# Patient Record
Sex: FEMALE | NOT HISPANIC OR LATINO | Employment: FULL TIME | URBAN - METROPOLITAN AREA
[De-identification: names, ages, dates, MRNs, and addresses within clinical notes are randomized per-mention and may not be internally consistent; named-entity substitution may affect disease eponyms.]

---

## 2024-01-03 ENCOUNTER — HOSPITAL ENCOUNTER (EMERGENCY)
Facility: HOSPITAL | Age: 34
Discharge: LEFT BEFORE DISCHARGE | End: 2024-01-03
Attending: EMERGENCY MEDICINE | Admitting: EMERGENCY MEDICINE
Payer: COMMERCIAL

## 2024-01-03 VITALS
HEIGHT: 66 IN | OXYGEN SATURATION: 100 % | TEMPERATURE: 98.8 F | BODY MASS INDEX: 25.3 KG/M2 | DIASTOLIC BLOOD PRESSURE: 79 MMHG | SYSTOLIC BLOOD PRESSURE: 134 MMHG | HEART RATE: 55 BPM | RESPIRATION RATE: 18 BRPM | WEIGHT: 157.41 LBS

## 2024-01-03 DIAGNOSIS — Z53.21 ELOPED FROM EMERGENCY DEPARTMENT: Primary | ICD-10-CM

## 2024-01-03 LAB
ALBUMIN SERPL-MCNC: 4.2 G/DL (ref 3.5–5.2)
ALBUMIN/GLOB SERPL: 1.2 G/DL
ALP SERPL-CCNC: 61 U/L (ref 39–117)
ALT SERPL W P-5'-P-CCNC: 12 U/L (ref 1–33)
ANION GAP SERPL CALCULATED.3IONS-SCNC: 10 MMOL/L (ref 5–15)
AST SERPL-CCNC: 14 U/L (ref 1–32)
BACTERIA UR QL AUTO: ABNORMAL /HPF
BASOPHILS # BLD AUTO: 0.03 10*3/MM3 (ref 0–0.2)
BASOPHILS NFR BLD AUTO: 0.4 % (ref 0–1.5)
BILIRUB SERPL-MCNC: 0.3 MG/DL (ref 0–1.2)
BILIRUB UR QL STRIP: NEGATIVE
BUN SERPL-MCNC: 5 MG/DL (ref 6–20)
BUN/CREAT SERPL: 6.4 (ref 7–25)
CALCIUM SPEC-SCNC: 9.3 MG/DL (ref 8.6–10.5)
CHLORIDE SERPL-SCNC: 106 MMOL/L (ref 98–107)
CLARITY UR: ABNORMAL
CO2 SERPL-SCNC: 21 MMOL/L (ref 22–29)
COLOR UR: YELLOW
CREAT SERPL-MCNC: 0.78 MG/DL (ref 0.57–1)
DEPRECATED RDW RBC AUTO: 40.8 FL (ref 37–54)
EGFRCR SERPLBLD CKD-EPI 2021: 103.6 ML/MIN/1.73
EOSINOPHIL # BLD AUTO: 0.06 10*3/MM3 (ref 0–0.4)
EOSINOPHIL NFR BLD AUTO: 0.8 % (ref 0.3–6.2)
ERYTHROCYTE [DISTWIDTH] IN BLOOD BY AUTOMATED COUNT: 12.9 % (ref 12.3–15.4)
GLOBULIN UR ELPH-MCNC: 3.6 GM/DL
GLUCOSE SERPL-MCNC: 110 MG/DL (ref 65–99)
GLUCOSE UR STRIP-MCNC: NEGATIVE MG/DL
HCG INTACT+B SERPL-ACNC: <0.5 MIU/ML
HCT VFR BLD AUTO: 40.5 % (ref 34–46.6)
HGB BLD-MCNC: 13.8 G/DL (ref 12–15.9)
HGB UR QL STRIP.AUTO: NEGATIVE
HOLD SPECIMEN: NORMAL
HOLD SPECIMEN: NORMAL
HYALINE CASTS UR QL AUTO: ABNORMAL /LPF
IMM GRANULOCYTES # BLD AUTO: 0.02 10*3/MM3 (ref 0–0.05)
IMM GRANULOCYTES NFR BLD AUTO: 0.3 % (ref 0–0.5)
KETONES UR QL STRIP: ABNORMAL
LEUKOCYTE ESTERASE UR QL STRIP.AUTO: NEGATIVE
LIPASE SERPL-CCNC: 28 U/L (ref 13–60)
LYMPHOCYTES # BLD AUTO: 0.63 10*3/MM3 (ref 0.7–3.1)
LYMPHOCYTES NFR BLD AUTO: 8.1 % (ref 19.6–45.3)
MCH RBC QN AUTO: 29.7 PG (ref 26.6–33)
MCHC RBC AUTO-ENTMCNC: 34.1 G/DL (ref 31.5–35.7)
MCV RBC AUTO: 87.3 FL (ref 79–97)
MONOCYTES # BLD AUTO: 0.4 10*3/MM3 (ref 0.1–0.9)
MONOCYTES NFR BLD AUTO: 5.2 % (ref 5–12)
NEUTROPHILS NFR BLD AUTO: 6.61 10*3/MM3 (ref 1.7–7)
NEUTROPHILS NFR BLD AUTO: 85.2 % (ref 42.7–76)
NITRITE UR QL STRIP: NEGATIVE
NRBC BLD AUTO-RTO: 0 /100 WBC (ref 0–0.2)
PH UR STRIP.AUTO: >=9 [PH] (ref 5–8)
PLATELET # BLD AUTO: 298 10*3/MM3 (ref 140–450)
PMV BLD AUTO: 10.7 FL (ref 6–12)
POTASSIUM SERPL-SCNC: 4 MMOL/L (ref 3.5–5.2)
PROT SERPL-MCNC: 7.8 G/DL (ref 6–8.5)
PROT UR QL STRIP: ABNORMAL
RBC # BLD AUTO: 4.64 10*6/MM3 (ref 3.77–5.28)
RBC # UR STRIP: ABNORMAL /HPF
REF LAB TEST METHOD: ABNORMAL
SODIUM SERPL-SCNC: 137 MMOL/L (ref 136–145)
SP GR UR STRIP: 1.02 (ref 1–1.03)
SQUAMOUS #/AREA URNS HPF: ABNORMAL /HPF
UROBILINOGEN UR QL STRIP: ABNORMAL
WBC # UR STRIP: ABNORMAL /HPF
WBC NRBC COR # BLD AUTO: 7.75 10*3/MM3 (ref 3.4–10.8)
WHOLE BLOOD HOLD COAG: NORMAL
WHOLE BLOOD HOLD SPECIMEN: NORMAL

## 2024-01-03 PROCEDURE — 99283 EMERGENCY DEPT VISIT LOW MDM: CPT

## 2024-01-03 PROCEDURE — 84702 CHORIONIC GONADOTROPIN TEST: CPT

## 2024-01-03 PROCEDURE — 36415 COLL VENOUS BLD VENIPUNCTURE: CPT

## 2024-01-03 PROCEDURE — 80053 COMPREHEN METABOLIC PANEL: CPT

## 2024-01-03 PROCEDURE — 83690 ASSAY OF LIPASE: CPT

## 2024-01-03 PROCEDURE — 85025 COMPLETE CBC W/AUTO DIFF WBC: CPT

## 2024-01-03 PROCEDURE — 81001 URINALYSIS AUTO W/SCOPE: CPT

## 2024-01-03 RX ORDER — SODIUM CHLORIDE 0.9 % (FLUSH) 0.9 %
10 SYRINGE (ML) INJECTION AS NEEDED
Status: DISCONTINUED | OUTPATIENT
Start: 2024-01-03 | End: 2024-01-03 | Stop reason: HOSPADM

## 2024-01-03 NOTE — ED PROVIDER NOTES
"Time: 2:46 PM EST  Date of encounter:  1/3/2024  Independent Historian/Clinical History and Information was obtained by:   Patient    History is limited by: N/A    Chief Complaint   Patient presents with    Abdominal Pain    Vomiting    Nausea         History of Present Illness:  Patient is a 33 y.o. year old female who presents to the emergency department for evaluation of abdominal pain, nausea/vomiting.  Since last night.  Denies diarrhea.  Denies fever/chills.  Denies sick contacts.  PRIYANK Westfall    Patient Care Team  Primary Care Provider: Provider, No Known    Past Medical History:     No Known Allergies  No past medical history on file.  No past surgical history on file.  No family history on file.    Home Medications:  Prior to Admission medications    Not on File        Social History:          Review of Systems:  Review of Systems   Gastrointestinal:  Positive for abdominal pain, nausea and vomiting.        Physical Exam:  /79 (BP Location: Right arm)   Pulse 55   Temp 98.8 °F (37.1 °C) (Oral)   Resp 18   Ht 167.6 cm (66\")   Wt 71.4 kg (157 lb 6.5 oz)   LMP 12/15/2023   SpO2 100%   BMI 25.41 kg/m²         Physical Exam  HENT:      Head: Normocephalic.      Mouth/Throat:      Mouth: Mucous membranes are moist.   Eyes:      Pupils: Pupils are equal, round, and reactive to light.   Pulmonary:      Effort: Pulmonary effort is normal.   Abdominal:      General: There is no distension.   Musculoskeletal:      Cervical back: Neck supple.   Skin:     General: Skin is warm and dry.   Neurological:      General: No focal deficit present.      Mental Status: She is alert and oriented to person, place, and time.   Psychiatric:         Mood and Affect: Mood normal.         Behavior: Behavior normal.                      Procedures:  Procedures      Medical Decision Making:      Comorbidities that affect care:    None    External Notes reviewed:          The following orders were placed and all results " were independently analyzed by me:  Orders Placed This Encounter   Procedures    [place] Draw    Comprehensive Metabolic Panel    Lipase    Urinalysis With Microscopic If Indicated (No Culture) - Urine, Clean Catch    hCG, Quantitative, Pregnancy    CBC Auto Differential    Urinalysis, Microscopic Only - Urine, Clean Catch    CBC & Differential    Green Top (Gel)    Lavender Top    Gold Top - SST    Light Blue Top       Medications Given in the Emergency Department:  Medications - No data to display       ED Course:    The patient was initially evaluated in the triage area where orders were placed. The patient was later dispositioned by PRIYANK Stanford.      The patient was advised to stay for completion of workup which includes but is not limited to communication of labs and radiological results, reassessment and plan. The patient was advised that leaving prior to disposition by a provider could result in critical findings that are not communicated to the patient.          Labs:    Lab Results (last 24 hours)       Procedure Component Value Units Date/Time    CBC & Differential [[id number]]  (Abnormal) Collected: 01/03/24 1252    Specimen: Blood Updated: 01/03/24 1345    Narrative:      The following orders were created for panel order CBC & Differential.  Procedure                               Abnormality         Status                     ---------                               -----------         ------                     CBC Auto Differential[[id number]]        Abnormal            Final result                 Please view results for these tests on the individual orders.    Comprehensive Metabolic Panel [[id number]]  (Abnormal) Collected: 01/03/24 1252    Specimen: Blood Updated: 01/03/24 1403     Glucose 110 mg/dL      BUN 5 mg/dL      Creatinine 0.78 mg/dL      Sodium 137 mmol/L      Potassium 4.0 mmol/L      Chloride 106 mmol/L      CO2 21.0 mmol/L      Calcium 9.3 mg/dL      Total Protein 7.8 g/dL       Albumin 4.2 g/dL      ALT (SGPT) 12 U/L      AST (SGOT) 14 U/L      Alkaline Phosphatase 61 U/L      Total Bilirubin 0.3 mg/dL      Globulin 3.6 gm/dL      A/G Ratio 1.2 g/dL      BUN/Creatinine Ratio 6.4     Anion Gap 10.0 mmol/L      eGFR 103.6 mL/min/1.73     Narrative:      GFR Normal >60  Chronic Kidney Disease <60  Kidney Failure <15      Lipase [[id number]]  (Normal) Collected: 01/03/24 1252    Specimen: Blood Updated: 01/03/24 1403     Lipase 28 U/L     hCG, Quantitative, Pregnancy [[id number]] Collected: 01/03/24 1252    Specimen: Blood Updated: 01/03/24 1402     HCG Quantitative <0.50 mIU/mL     Narrative:      HCG Ranges by Gestational Age    Females - non-pregnant premenopausal   </= 1mIU/mL HCG  Females - postmenopausal               </= 7mIU/mL HCG    3 Weeks       5.4   -      72 mIU/mL  4 Weeks      10.2   -     708 mIU/mL  5 Weeks       217   -   8,245 mIU/mL  6 Weeks       152   -  32,177 mIU/mL  7 Weeks     4,059   - 153,767 mIU/mL  8 Weeks    31,366   - 149,094 mIU/mL  9 Weeks    59,109   - 135,901 mIU/mL  10 Weeks   44,186   - 170,409 mIU/mL  12 Weeks   27,107   - 201,615 mIU/mL  14 Weeks   24,302   -  93,646 mIU/mL  15 Weeks   12,540   -  69,747 mIU/mL  16 Weeks    8,904   -  55,332 mIU/mL  17 Weeks    8,240   -  51,793 mIU/mL  18 Weeks    9,649   -  55,271 mIU/mL      CBC Auto Differential [[id number]]  (Abnormal) Collected: 01/03/24 1252    Specimen: Blood Updated: 01/03/24 1345     WBC 7.75 10*3/mm3      RBC 4.64 10*6/mm3      Hemoglobin 13.8 g/dL      Hematocrit 40.5 %      MCV 87.3 fL      MCH 29.7 pg      MCHC 34.1 g/dL      RDW 12.9 %      RDW-SD 40.8 fl      MPV 10.7 fL      Platelets 298 10*3/mm3      Neutrophil % 85.2 %      Lymphocyte % 8.1 %      Monocyte % 5.2 %      Eosinophil % 0.8 %      Basophil % 0.4 %      Immature Grans % 0.3 %      Neutrophils, Absolute 6.61 10*3/mm3      Lymphocytes, Absolute 0.63 10*3/mm3      Monocytes, Absolute 0.40 10*3/mm3      Eosinophils, Absolute 0.06  10*3/mm3      Basophils, Absolute 0.03 10*3/mm3      Immature Grans, Absolute 0.02 10*3/mm3      nRBC 0.0 /100 WBC     Urinalysis With Microscopic If Indicated (No Culture) - Urine, Clean Catch [[id number]]  (Abnormal) Collected: 01/03/24 1400    Specimen: Urine, Clean Catch Updated: 01/03/24 1456     Color, UA Yellow     Appearance, UA Cloudy     pH, UA >=9.0     Specific Gravity, UA 1.025     Glucose, UA Negative     Ketones, UA 40 mg/dL (2+)     Bilirubin, UA Negative     Blood, UA Negative     Protein, UA 30 mg/dL (1+)     Leuk Esterase, UA Negative     Nitrite, UA Negative     Urobilinogen, UA 1.0 E.U./dL    Urinalysis, Microscopic Only - Urine, Clean Catch [[id number]]  (Abnormal) Collected: 01/03/24 1400    Specimen: Urine, Clean Catch Updated: 01/03/24 1456     RBC, UA 3-5 /HPF      WBC, UA 6-10 /HPF      Bacteria, UA 2+ /HPF      Squamous Epithelial Cells, UA 31-50 /HPF      Hyaline Casts, UA 3-6 /LPF      Methodology Automated Microscopy             Imaging:    No Radiology Exams Resulted Within Past 24 Hours      Differential Diagnosis and Discussion:      Abdominal Pain: Based on the patient's signs and symptoms, I considered abdominal aortic aneurysm, small bowel obstruction, pancreatitis, acute cholecystitis, acute appendecitis, peptic ulcer disease, gastritis, colitis, endocrine disorders, irritable bowel syndrome and other differential diagnosis an etiology of the patient's abdominal pain.        MDM     Amount and/or Complexity of Data Reviewed  Clinical lab tests: reviewed                 Patient Care Considerations:          Consultants/Shared Management Plan:        Social Determinants of Health:          Disposition and Care Coordination:    Eloped: This patient has left the emergency department or waiting room with no communication to myself, nursing or administrative staff. There was no opportunity to discuss the patient's decision to leave, provide medical advice or discuss alternatives to. The  staff has made efforts to locate patient without success.        Final diagnoses:   Eloped from emergency department        ED Disposition       ED Disposition   Eloped    Condition   --    Comment   --               This medical record created using voice recognition software.             Katerine Tompkins, APRN  01/03/24 6879

## 2024-06-24 ENCOUNTER — HOSPITAL ENCOUNTER (EMERGENCY)
Facility: HOSPITAL | Age: 34
Discharge: STILL ADMITTED OR ADMINISTRATIVE | End: 2024-06-24
Attending: EMERGENCY MEDICINE
Payer: COMMERCIAL

## 2024-06-24 ENCOUNTER — APPOINTMENT (OUTPATIENT)
Dept: ULTRASOUND IMAGING | Facility: HOSPITAL | Age: 34
End: 2024-06-24
Payer: COMMERCIAL

## 2024-06-24 ENCOUNTER — HOSPITAL ENCOUNTER (INPATIENT)
Facility: HOSPITAL | Age: 34
LOS: 1 days | Discharge: HOME | End: 2024-06-25
Attending: OBSTETRICS & GYNECOLOGY | Admitting: OBSTETRICS & GYNECOLOGY
Payer: COMMERCIAL

## 2024-06-24 VITALS
SYSTOLIC BLOOD PRESSURE: 129 MMHG | WEIGHT: 153.22 LBS | OXYGEN SATURATION: 100 % | TEMPERATURE: 98.4 F | DIASTOLIC BLOOD PRESSURE: 66 MMHG | BODY MASS INDEX: 24.62 KG/M2 | RESPIRATION RATE: 18 BRPM | HEART RATE: 101 BPM | HEIGHT: 66 IN

## 2024-06-24 DIAGNOSIS — O03.9 MISCARRIAGE AT 8 TO 28 WEEKS GESTATION: Primary | ICD-10-CM

## 2024-06-24 PROBLEM — O02.1 MISSED ABORTION WITH FETAL DEMISE BEFORE 20 COMPLETED WEEKS OF GESTATION: Status: ACTIVE | Noted: 2024-06-24

## 2024-06-24 LAB
ABO GROUP BLD: NORMAL
ABO GROUP BLD: NORMAL
ALBUMIN SERPL-MCNC: 3.6 G/DL (ref 3.5–5.2)
ALBUMIN/GLOB SERPL: 1.2 G/DL
ALP SERPL-CCNC: 60 U/L (ref 39–117)
ALT SERPL W P-5'-P-CCNC: 7 U/L (ref 1–33)
ANION GAP SERPL CALCULATED.3IONS-SCNC: 12.1 MMOL/L (ref 5–15)
AST SERPL-CCNC: 14 U/L (ref 1–32)
BASOPHILS # BLD AUTO: 0.06 10*3/MM3 (ref 0–0.2)
BASOPHILS NFR BLD AUTO: 0.4 % (ref 0–1.5)
BILIRUB SERPL-MCNC: 0.3 MG/DL (ref 0–1.2)
BLD GP AB SCN SERPL QL: NEGATIVE
BUN SERPL-MCNC: 4 MG/DL (ref 6–20)
BUN/CREAT SERPL: 6.3 (ref 7–25)
C TRACH RRNA CVX QL NAA+PROBE: NOT DETECTED
CALCIUM SPEC-SCNC: 8.7 MG/DL (ref 8.6–10.5)
CANDIDA SPECIES: POSITIVE
CHLORIDE SERPL-SCNC: 104 MMOL/L (ref 98–107)
CO2 SERPL-SCNC: 18.9 MMOL/L (ref 22–29)
CREAT SERPL-MCNC: 0.63 MG/DL (ref 0.57–1)
DEPRECATED RDW RBC AUTO: 41.3 FL (ref 37–54)
EGFRCR SERPLBLD CKD-EPI 2021: 120.3 ML/MIN/1.73
EOSINOPHIL # BLD AUTO: 0.32 10*3/MM3 (ref 0–0.4)
EOSINOPHIL NFR BLD AUTO: 2.2 % (ref 0.3–6.2)
ERYTHROCYTE [DISTWIDTH] IN BLOOD BY AUTOMATED COUNT: 13.2 % (ref 12.3–15.4)
GARDNERELLA VAGINALIS: NEGATIVE
GLOBULIN UR ELPH-MCNC: 2.9 GM/DL
GLUCOSE SERPL-MCNC: 112 MG/DL (ref 65–99)
HCG INTACT+B SERPL-ACNC: NORMAL MIU/ML
HCT VFR BLD AUTO: 36.6 % (ref 34–46.6)
HGB BLD-MCNC: 12.9 G/DL (ref 12–15.9)
HGB F BLD QL KLEIH BETKE: NORMAL
IMM GRANULOCYTES # BLD AUTO: 0.09 10*3/MM3 (ref 0–0.05)
IMM GRANULOCYTES NFR BLD AUTO: 0.6 % (ref 0–0.5)
LYMPHOCYTES # BLD AUTO: 2.01 10*3/MM3 (ref 0.7–3.1)
LYMPHOCYTES NFR BLD AUTO: 13.8 % (ref 19.6–45.3)
MCH RBC QN AUTO: 30.5 PG (ref 26.6–33)
MCHC RBC AUTO-ENTMCNC: 35.2 G/DL (ref 31.5–35.7)
MCV RBC AUTO: 86.5 FL (ref 79–97)
MONOCYTES # BLD AUTO: 0.89 10*3/MM3 (ref 0.1–0.9)
MONOCYTES NFR BLD AUTO: 6.1 % (ref 5–12)
N GONORRHOEA RRNA SPEC QL NAA+PROBE: NOT DETECTED
NEUTROPHILS NFR BLD AUTO: 11.24 10*3/MM3 (ref 1.7–7)
NEUTROPHILS NFR BLD AUTO: 76.9 % (ref 42.7–76)
NRBC BLD AUTO-RTO: 0 /100 WBC (ref 0–0.2)
PLATELET # BLD AUTO: 283 10*3/MM3 (ref 140–450)
PMV BLD AUTO: 10.6 FL (ref 6–12)
POTASSIUM SERPL-SCNC: 3.7 MMOL/L (ref 3.5–5.2)
PROT SERPL-MCNC: 6.5 G/DL (ref 6–8.5)
RBC # BLD AUTO: 4.23 10*6/MM3 (ref 3.77–5.28)
RH BLD: POSITIVE
RH BLD: POSITIVE
SODIUM SERPL-SCNC: 135 MMOL/L (ref 136–145)
T PALLIDUM IGG SER QL: NORMAL
T VAGINALIS DNA VAG QL PROBE+SIG AMP: NEGATIVE
T&S EXPIRATION DATE: NORMAL
WBC NRBC COR # BLD AUTO: 14.61 10*3/MM3 (ref 3.4–10.8)
WHOLE BLOOD HOLD COAG: NORMAL

## 2024-06-24 PROCEDURE — 86147 CARDIOLIPIN ANTIBODY EA IG: CPT | Performed by: OBSTETRICS & GYNECOLOGY

## 2024-06-24 PROCEDURE — 80307 DRUG TEST PRSMV CHEM ANLYZR: CPT | Performed by: OBSTETRICS & GYNECOLOGY

## 2024-06-24 PROCEDURE — 25810000003 LACTATED RINGERS PER 1000 ML: Performed by: OBSTETRICS & GYNECOLOGY

## 2024-06-24 PROCEDURE — 86146 BETA-2 GLYCOPROTEIN ANTIBODY: CPT | Performed by: OBSTETRICS & GYNECOLOGY

## 2024-06-24 PROCEDURE — 86901 BLOOD TYPING SEROLOGIC RH(D): CPT | Performed by: OBSTETRICS & GYNECOLOGY

## 2024-06-24 PROCEDURE — 85670 THROMBIN TIME PLASMA: CPT | Performed by: OBSTETRICS & GYNECOLOGY

## 2024-06-24 PROCEDURE — 99285 EMERGENCY DEPT VISIT HI MDM: CPT

## 2024-06-24 PROCEDURE — 86900 BLOOD TYPING SEROLOGIC ABO: CPT | Performed by: OBSTETRICS & GYNECOLOGY

## 2024-06-24 PROCEDURE — 85705 THROMBOPLASTIN INHIBITION: CPT | Performed by: OBSTETRICS & GYNECOLOGY

## 2024-06-24 PROCEDURE — 87591 N.GONORRHOEAE DNA AMP PROB: CPT | Performed by: OBSTETRICS & GYNECOLOGY

## 2024-06-24 PROCEDURE — 85732 THROMBOPLASTIN TIME PARTIAL: CPT | Performed by: OBSTETRICS & GYNECOLOGY

## 2024-06-24 PROCEDURE — 85025 COMPLETE CBC W/AUTO DIFF WBC: CPT

## 2024-06-24 PROCEDURE — 87660 TRICHOMONAS VAGIN DIR PROBE: CPT | Performed by: OBSTETRICS & GYNECOLOGY

## 2024-06-24 PROCEDURE — 86780 TREPONEMA PALLIDUM: CPT | Performed by: OBSTETRICS & GYNECOLOGY

## 2024-06-24 PROCEDURE — 36415 COLL VENOUS BLD VENIPUNCTURE: CPT

## 2024-06-24 PROCEDURE — 85613 RUSSELL VIPER VENOM DILUTED: CPT | Performed by: OBSTETRICS & GYNECOLOGY

## 2024-06-24 PROCEDURE — 87510 GARDNER VAG DNA DIR PROBE: CPT | Performed by: OBSTETRICS & GYNECOLOGY

## 2024-06-24 PROCEDURE — 85460 HEMOGLOBIN FETAL: CPT | Performed by: OBSTETRICS & GYNECOLOGY

## 2024-06-24 PROCEDURE — 76805 OB US >/= 14 WKS SNGL FETUS: CPT

## 2024-06-24 PROCEDURE — 25010000002 MORPHINE PER 10 MG: Performed by: OBSTETRICS & GYNECOLOGY

## 2024-06-24 PROCEDURE — 84702 CHORIONIC GONADOTROPIN TEST: CPT

## 2024-06-24 PROCEDURE — 87480 CANDIDA DNA DIR PROBE: CPT | Performed by: OBSTETRICS & GYNECOLOGY

## 2024-06-24 PROCEDURE — 87491 CHLMYD TRACH DNA AMP PROBE: CPT | Performed by: OBSTETRICS & GYNECOLOGY

## 2024-06-24 PROCEDURE — 86850 RBC ANTIBODY SCREEN: CPT | Performed by: OBSTETRICS & GYNECOLOGY

## 2024-06-24 PROCEDURE — 80053 COMPREHEN METABOLIC PANEL: CPT

## 2024-06-24 PROCEDURE — 25010000002 MORPHINE SULFATE (PF) 5 MG/ML SOLUTION: Performed by: OBSTETRICS & GYNECOLOGY

## 2024-06-24 RX ORDER — OXYTOCIN/0.9 % SODIUM CHLORIDE 30/500 ML
999 PLASTIC BAG, INJECTION (ML) INTRAVENOUS ONCE
Status: COMPLETED | OUTPATIENT
Start: 2024-06-24 | End: 2024-06-24

## 2024-06-24 RX ORDER — SODIUM CHLORIDE, SODIUM LACTATE, POTASSIUM CHLORIDE, CALCIUM CHLORIDE 600; 310; 30; 20 MG/100ML; MG/100ML; MG/100ML; MG/100ML
125 INJECTION, SOLUTION INTRAVENOUS CONTINUOUS
Status: DISCONTINUED | OUTPATIENT
Start: 2024-06-24 | End: 2024-06-25 | Stop reason: HOSPADM

## 2024-06-24 RX ORDER — FAMOTIDINE 20 MG/1
20 TABLET, FILM COATED ORAL 2 TIMES DAILY PRN
Status: DISCONTINUED | OUTPATIENT
Start: 2024-06-24 | End: 2024-06-25 | Stop reason: HOSPADM

## 2024-06-24 RX ORDER — ACETAMINOPHEN 325 MG/1
650 TABLET ORAL EVERY 4 HOURS PRN
Status: DISCONTINUED | OUTPATIENT
Start: 2024-06-24 | End: 2024-06-25 | Stop reason: HOSPADM

## 2024-06-24 RX ORDER — FAMOTIDINE 10 MG/ML
20 INJECTION, SOLUTION INTRAVENOUS ONCE AS NEEDED
Status: DISCONTINUED | OUTPATIENT
Start: 2024-06-24 | End: 2024-06-25 | Stop reason: HOSPADM

## 2024-06-24 RX ORDER — PROMETHAZINE HYDROCHLORIDE 25 MG/1
25 TABLET ORAL EVERY 6 HOURS PRN
Status: DISCONTINUED | OUTPATIENT
Start: 2024-06-24 | End: 2024-06-25 | Stop reason: HOSPADM

## 2024-06-24 RX ORDER — HYDROCODONE BITARTRATE AND ACETAMINOPHEN 5; 325 MG/1; MG/1
1 TABLET ORAL EVERY 4 HOURS PRN
Status: DISCONTINUED | OUTPATIENT
Start: 2024-06-24 | End: 2024-06-25 | Stop reason: HOSPADM

## 2024-06-24 RX ORDER — ACETAMINOPHEN 325 MG/1
650 TABLET ORAL ONCE AS NEEDED
Status: DISCONTINUED | OUTPATIENT
Start: 2024-06-24 | End: 2024-06-25 | Stop reason: HOSPADM

## 2024-06-24 RX ORDER — OXYTOCIN/0.9 % SODIUM CHLORIDE 30/500 ML
250 PLASTIC BAG, INJECTION (ML) INTRAVENOUS CONTINUOUS
Status: ACTIVE | OUTPATIENT
Start: 2024-06-24 | End: 2024-06-24

## 2024-06-24 RX ORDER — ONDANSETRON 2 MG/ML
4 INJECTION INTRAMUSCULAR; INTRAVENOUS EVERY 6 HOURS PRN
Status: DISCONTINUED | OUTPATIENT
Start: 2024-06-24 | End: 2024-06-25 | Stop reason: HOSPADM

## 2024-06-24 RX ORDER — PROMETHAZINE HYDROCHLORIDE 25 MG/1
25 TABLET ORAL EVERY 6 HOURS PRN
COMMUNITY

## 2024-06-24 RX ORDER — ONDANSETRON 4 MG/1
4 TABLET, ORALLY DISINTEGRATING ORAL EVERY 6 HOURS PRN
Status: DISCONTINUED | OUTPATIENT
Start: 2024-06-24 | End: 2024-06-25 | Stop reason: HOSPADM

## 2024-06-24 RX ORDER — FAMOTIDINE 10 MG/ML
20 INJECTION, SOLUTION INTRAVENOUS 2 TIMES DAILY PRN
Status: DISCONTINUED | OUTPATIENT
Start: 2024-06-24 | End: 2024-06-25 | Stop reason: HOSPADM

## 2024-06-24 RX ORDER — HYDROCODONE BITARTRATE AND ACETAMINOPHEN 10; 325 MG/1; MG/1
1 TABLET ORAL EVERY 4 HOURS PRN
Status: DISCONTINUED | OUTPATIENT
Start: 2024-06-24 | End: 2024-06-25 | Stop reason: HOSPADM

## 2024-06-24 RX ORDER — FAMOTIDINE 20 MG/1
20 TABLET, FILM COATED ORAL ONCE AS NEEDED
Status: DISCONTINUED | OUTPATIENT
Start: 2024-06-24 | End: 2024-06-25 | Stop reason: HOSPADM

## 2024-06-24 RX ORDER — OXYTOCIN/0.9 % SODIUM CHLORIDE 30/500 ML
PLASTIC BAG, INJECTION (ML) INTRAVENOUS
Status: COMPLETED
Start: 2024-06-24 | End: 2024-06-24

## 2024-06-24 RX ORDER — IBUPROFEN 800 MG/1
800 TABLET ORAL ONCE AS NEEDED
Status: DISCONTINUED | OUTPATIENT
Start: 2024-06-24 | End: 2024-06-25 | Stop reason: HOSPADM

## 2024-06-24 RX ORDER — MISOPROSTOL 200 UG/1
600 TABLET ORAL ONCE
Status: DISCONTINUED | OUTPATIENT
Start: 2024-06-24 | End: 2024-06-24

## 2024-06-24 RX ORDER — CITRIC ACID/SODIUM CITRATE 334-500MG
30 SOLUTION, ORAL ORAL ONCE AS NEEDED
Status: DISCONTINUED | OUTPATIENT
Start: 2024-06-24 | End: 2024-06-25 | Stop reason: HOSPADM

## 2024-06-24 RX ORDER — MISOPROSTOL 200 UG/1
800 TABLET ORAL ONCE
Status: COMPLETED | OUTPATIENT
Start: 2024-06-24 | End: 2024-06-24

## 2024-06-24 RX ORDER — MORPHINE SULFATE 5 MG/ML
5 INJECTION, SOLUTION INTRAMUSCULAR; INTRAVENOUS
Status: DISCONTINUED | OUTPATIENT
Start: 2024-06-24 | End: 2024-06-25 | Stop reason: HOSPADM

## 2024-06-24 RX ORDER — MORPHINE SULFATE 2 MG/ML
2 INJECTION, SOLUTION INTRAMUSCULAR; INTRAVENOUS ONCE
Status: COMPLETED | OUTPATIENT
Start: 2024-06-24 | End: 2024-06-24

## 2024-06-24 RX ORDER — MISOPROSTOL 200 UG/1
600 TABLET ORAL ONCE
Status: COMPLETED | OUTPATIENT
Start: 2024-06-24 | End: 2024-06-24

## 2024-06-24 RX ORDER — LANOLIN ALCOHOL/MO/W.PET/CERES
50 CREAM (GRAM) TOPICAL DAILY
COMMUNITY

## 2024-06-24 RX ADMIN — MORPHINE SULFATE 2 MG: 2 INJECTION, SOLUTION INTRAMUSCULAR; INTRAVENOUS at 20:11

## 2024-06-24 RX ADMIN — Medication 30 UNITS: at 19:15

## 2024-06-24 RX ADMIN — MISOPROSTOL 600 MCG: 200 TABLET ORAL at 18:14

## 2024-06-24 RX ADMIN — SODIUM CHLORIDE, POTASSIUM CHLORIDE, SODIUM LACTATE AND CALCIUM CHLORIDE 125 ML/HR: 600; 310; 30; 20 INJECTION, SOLUTION INTRAVENOUS at 17:00

## 2024-06-24 RX ADMIN — MORPHINE SULFATE 5 MG: 5 INJECTION, SOLUTION INTRAMUSCULAR; INTRAVENOUS at 18:14

## 2024-06-24 RX ADMIN — Medication 250 ML/HR: at 19:40

## 2024-06-24 RX ADMIN — MISOPROSTOL 800 MCG: 200 TABLET ORAL at 22:23

## 2024-06-24 RX ADMIN — MORPHINE SULFATE 5 MG: 5 INJECTION, SOLUTION INTRAMUSCULAR; INTRAVENOUS at 22:27

## 2024-06-24 NOTE — ED NOTES
RN called LD regarding patient. LD does not take patient below 20 weeks. LD aware and states with provider order a nurse can come to bedside to evaluate.

## 2024-06-24 NOTE — PROGRESS NOTES
Late entry:  Called to patient's room. She had delivered the fetus at 7:13pm.  The cord was clamped and cut.   The mom did not want to view the baby.  The baby had no visible defects. There appeared to be scalp edema and contractures of the extremities.  It appeared to be a male fetus.   It was taken to the warmer behind the curtain.  Pitocin was started.   She'll receive another dose cytotec at 22:14.     Krystina Ornelas MD  6/24/2024  19:59 EDT    After further exam (one hour after delivery) fetus was identified as female. Patient and father of baby notified and I apologized for the initial diagnosis of male fetus.     Krystina Ornelas MD  6/24/2024  22:30 EDT

## 2024-06-24 NOTE — ED PROVIDER NOTES
Time: 2:18 PM EDT  Date of encounter:  2024  Independent Historian/Clinical History and Information was obtained by:   Patient    History is limited by: N/A    Chief Complaint: Pregnancy problem      History of Present Illness:  Patient is a  34 y.o. year old female who presents to the emergency department for evaluation of abdominal cramping that started yesterday.  Patient states her water broke 20 minutes PTA.  She is currently 16 weeks pregnant.  OB/GYN is Kayleigh.  Denies vaginal bleeding.  Patient states overnight last night she was having more severe cramping but that since her water broke her cramping is improved currently.  Afebrile.    HPI    Patient Care Team  Primary Care Provider: Provider, No Known    Past Medical History:     No Known Allergies  No past medical history on file.  No past surgical history on file.  No family history on file.    Home Medications:  Prior to Admission medications    Not on File        Social History:          Review of Systems:  Review of Systems   Constitutional:  Negative for chills and fever.   HENT:  Negative for congestion, rhinorrhea and sore throat.    Eyes:  Negative for photophobia.   Respiratory:  Negative for apnea, cough, chest tightness and shortness of breath.    Cardiovascular:  Negative for chest pain and palpitations.   Gastrointestinal:  Negative for abdominal pain, diarrhea, nausea and vomiting.   Endocrine: Negative.    Genitourinary:  Positive for pelvic pain and vaginal pain. Negative for difficulty urinating, dysuria and vaginal bleeding.   Musculoskeletal:  Negative for back pain, joint swelling and myalgias.   Skin:  Negative for color change and wound.   Allergic/Immunologic: Negative.    Neurological:  Negative for seizures and headaches.   Psychiatric/Behavioral: Negative.     All other systems reviewed and are negative.       Physical Exam:  /66 (BP Location: Left arm, Patient Position: Sitting)   Pulse 101   Temp 98.4 °F  "(36.9 °C) (Oral)   Resp 18   Ht 167.6 cm (66\")   Wt 69.5 kg (153 lb 3.5 oz)   LMP 12/15/2023   SpO2 100%   BMI 24.73 kg/m²     Physical Exam  HENT:      Head: Normocephalic.      Mouth/Throat:      Mouth: Mucous membranes are moist.   Eyes:      Pupils: Pupils are equal, round, and reactive to light.   Pulmonary:      Effort: Pulmonary effort is normal.   Abdominal:      General: There is no distension.   Musculoskeletal:      Cervical back: Neck supple.   Skin:     General: Skin is warm and dry.   Neurological:      General: No focal deficit present.      Mental Status: She is alert and oriented to person, place, and time.   Psychiatric:         Mood and Affect: Mood normal.         Behavior: Behavior normal.                  Procedures:  Procedures      Medical Decision Making:      Comorbidities that affect care:    Second trimester pregnancy    External Notes reviewed:    Previous Clinic Note: [facility] OB/GYN initial prenatal visit 2024.  Gestational age 12 weeks and 5 days.  Progress note as follows:  Assessment and Plan    Delma was seen today for initial prenatal visit.    Diagnoses and all orders for this visit:     screening encounter  - POCT Urinalysis W/O Micro    Supervision of other normal pregnancy, antepartum  Assessment & Plan:  No concerns currently. occ cramping. Denies vaginal bleeding  Anatomy u/s ordered  Prenatal precautions reviewed  No complications with prior deliveries  ER precautions  Follow up 4wks    Orders:  - Pap Smear; Future  - Ambulatory referral to Maternal and Fetal Medicine    Trichomonas infection  Assessment & Plan:  S/p treatment . Will need CARLOS A at follow up appointment    Marijuana use during pregnancy  Assessment & Plan:  Counseled on cessation. Patient states she stopped when she had positive pregnancy test    Pregnancy complicated by tobacco use in first trimester  Assessment & Plan:  Counseled on tobacco cessation in pregnancy    Nausea and " "vomiting during pregnancy  Assessment & Plan:  Currently on VitB6. rx given for unisom and phenergan PRN  Recommend prenatal gummy instead of pill    Yeast infection  Assessment & Plan:  Yeast on exam today. rx given    Other orders  - Discontinue: doxylamine succinate (UNISOM) 25 MG tablet 12.5 mg  - promethazine (PHENERGAN) 12.5 MG tablet; Take 1 tablet by mouth every 6 (six) hours as needed for Nausea.  - clotrimazole (LOTRIMIN) 1 % cream; Apply topically nightly for 7 days to affected area(s)..  - doxylamine succinate (UNISOM SLEEPTABS) 25 MG tablet; Take 0.5 tablets by mouth nightly as needed (nausea).    Sera Seay MD  2:28 PM 5/28/2024  Electronically signed by Sera Seay MD at 05/28/2024 2:28 PM EDT     The following orders were placed and all results were independently analyzed by me:  Orders Placed This Encounter   Procedures    US Ob 14 + Weeks Single or First Gestation    Comprehensive Metabolic Panel    hCG, Quantitative, Pregnancy    CBC Auto Differential    Monitor fetal heart tones    Please have OB come down to eval  Nursing Communication    OB / GYN (on-call MD unless specified)    CBC & Differential    Extra Tubes    Light Blue Top       Medications Given in the Emergency Department:  Medications - No data to display     ED Course:    ED Course as of 06/24/24 1622   Mon Jun 24, 2024   1418 --- PROVIDER IN TRIAGE NOTE ---    The patient was evaluated by me, Manav Cervantes in triage. Orders were placed and the patient is currently awaiting disposition.    [AJ]   1538 Case discussed with radiology.  No definite cardiac activity but the fetus is very low and a \"nonviable\" position [RP]      ED Course User Index  [AJ] Manav Cervantes PA-C  [RP] Alejandro Rob MD       Labs:    Lab Results (last 24 hours)       Procedure Component Value Units Date/Time    CBC & Differential [[id number]]  (Abnormal) Collected: 06/24/24 1422    Specimen: Blood from Arm, Right Updated: 06/24/24 1439    " Narrative:      The following orders were created for panel order CBC & Differential.  Procedure                               Abnormality         Status                     ---------                               -----------         ------                     CBC Auto Differential[[id number]]        Abnormal            Final result                 Please view results for these tests on the individual orders.    Comprehensive Metabolic Panel [[id number]]  (Abnormal) Collected: 06/24/24 1422    Specimen: Blood from Arm, Right Updated: 06/24/24 1505     Glucose 112 mg/dL      BUN 4 mg/dL      Creatinine 0.63 mg/dL      Sodium 135 mmol/L      Potassium 3.7 mmol/L      Chloride 104 mmol/L      CO2 18.9 mmol/L      Calcium 8.7 mg/dL      Total Protein 6.5 g/dL      Albumin 3.6 g/dL      ALT (SGPT) 7 U/L      AST (SGOT) 14 U/L      Alkaline Phosphatase 60 U/L      Total Bilirubin 0.3 mg/dL      Globulin 2.9 gm/dL      A/G Ratio 1.2 g/dL      BUN/Creatinine Ratio 6.3     Anion Gap 12.1 mmol/L      eGFR 120.3 mL/min/1.73     Narrative:      GFR Normal >60  Chronic Kidney Disease <60  Kidney Failure <15      hCG, Quantitative, Pregnancy [[id number]] Collected: 06/24/24 1422    Specimen: Blood from Arm, Right Updated: 06/24/24 1513     HCG Quantitative 34,593.00 mIU/mL     Narrative:      HCG Ranges by Gestational Age    Females - non-pregnant premenopausal   </= 1mIU/mL HCG  Females - postmenopausal               </= 7mIU/mL HCG    3 Weeks       5.4   -      72 mIU/mL  4 Weeks      10.2   -     708 mIU/mL  5 Weeks       217   -   8,245 mIU/mL  6 Weeks       152   -  32,177 mIU/mL  7 Weeks     4,059   - 153,767 mIU/mL  8 Weeks    31,366   - 149,094 mIU/mL  9 Weeks    59,109   - 135,901 mIU/mL  10 Weeks   44,186   - 170,409 mIU/mL  12 Weeks   27,107   - 201,615 mIU/mL  14 Weeks   24,302   -  93,646 mIU/mL  15 Weeks   12,540   -  69,747 mIU/mL  16 Weeks    8,904   -  55,332 mIU/mL  17 Weeks    8,240   -  51,793 mIU/mL  18 Weeks     9,649   -  55,271 mIU/mL      CBC Auto Differential [[id number]]  (Abnormal) Collected: 06/24/24 1422    Specimen: Blood from Arm, Right Updated: 06/24/24 1439     WBC 14.61 10*3/mm3      RBC 4.23 10*6/mm3      Hemoglobin 12.9 g/dL      Hematocrit 36.6 %      MCV 86.5 fL      MCH 30.5 pg      MCHC 35.2 g/dL      RDW 13.2 %      RDW-SD 41.3 fl      MPV 10.6 fL      Platelets 283 10*3/mm3      Neutrophil % 76.9 %      Lymphocyte % 13.8 %      Monocyte % 6.1 %      Eosinophil % 2.2 %      Basophil % 0.4 %      Immature Grans % 0.6 %      Neutrophils, Absolute 11.24 10*3/mm3      Lymphocytes, Absolute 2.01 10*3/mm3      Monocytes, Absolute 0.89 10*3/mm3      Eosinophils, Absolute 0.32 10*3/mm3      Basophils, Absolute 0.06 10*3/mm3      Immature Grans, Absolute 0.09 10*3/mm3      nRBC 0.0 /100 WBC              Imaging:    US Ob 14 + Weeks Single or First Gestation    Result Date: 6/24/2024  US OB 14 + WEEKS SINGLE OR FIRST GESTATION Date of Exam: 6/24/2024 2:46 PM EDT Indication: pelvic cramping, states water broke, 16 weeks. Comparison: No comparisons available. Technique: A transabdominal single fetal and maternal evaluation with a detailed fetal anatomic ultrasound was performed.  Ultrasound images were obtained per protocol. Findings:    There is a single intrauterine gestation. The fetus is in breech position. The caudal aspect of the fetus is located in the cervix, at the external os. No definite cardiac activity is demonstrated however there is limited visualization of the fetus due to its position. There is a single pocket of fluid that measures 2.25 cm.      Impression: Abnormal position of fetus as described. There is limited amniotic fluid. Fetal cardiac activity cannot be confirmed Critical result called to the emergency department at 3:35 p.m.  Electronically Signed: Danial Pichardo  6/24/2024 3:39 PM EDT  Workstation ID: [id number]       Differential Diagnosis and Discussion:    Vaginal Discharge: Differential  diagnosis includes but is not limited to bacterial vaginosis, candidiasis, trichomonas vaginitis, cervicitis, rectovaginal fistula, irritants and allergens, foreign body, pelvic inflammatory disease.    All labs were reviewed and interpreted by me.  Ultrasound impression was interpreted by me.     MDM               Patient Care Considerations:          Consultants/Shared Management Plan:    Consultant: I have discussed the case with OB/GYN on-call, Dr. Ornelas who states patient can be transferred upstairs to OB ED for further management.    Social Determinants of Health:    Patient is independent, reliable, and has access to care.       Disposition and Care Coordination:    Patient was transferred from our emergency department to OB ED upstairs for expectant management for miscarriage        Final diagnoses:   Miscarriage at 8 to 28 weeks gestation        ED Disposition       ED Disposition   Send to OB ED    Condition   --    Comment   --               This medical record created using voice recognition software.             Alejandro Rob MD  06/24/24 8246

## 2024-06-24 NOTE — H&P
JEFF[facility]  Obstetric History and Physical    Chief Complaint   Patient presents with    Rupture of Membranes     CRAMPING SINCE LAST PM AROUND 3 PM.SROM AT 11:00 TODAY. CLEAR FLUID       Subjective     HPI:    Patient is a 34 y.o. female  currently at 16w4d, presented to ED with/for cramping that started yesterday around 3 pm and had rupture of membranes at 11 am today. She denied any other symptoms except for a headache yesterday. No fever or chills. No bleeding or urinary symptoms. ED obtained US and could not see FHTs. Breech position. ? If they heard FHTs or not.     She has been seeing [facility] for her prenatal care. Dating was based on her LMP. Her dating US was on 24 and placed her at 9w0d and differed by 3 days. She was treated for trichomonas in first trimester and was positive for THC on PNL.    The following portions of the patients history were reviewed and updated as appropriate:   current medications, allergies, past medical history, past surgical history, past family history, past social history and current problem list.     Prenatal Information:  Prenatal Results       Initial Prenatal Labs       Test Value Reference Range Date Time    Hemoglobin  12.9 g/dL 12.0 - 15.9 24 1422      ^ 13.5 g/dL 12.0 - 16.0 24 1558    Hematocrit  36.6 % 34.0 - 46.6 24 1422      ^ 40.6 % 36.0 - 46.0 24 1558    Platelets  283 10*3/mm3 140 - 450 24 1422      ^ 308 10*3/uL 140 - 440 24 1558    Rubella IgG        Hepatitis B SAg ^ Nonreactive  Nonreactive 24 1558    Hepatitis C Ab ^ Nonreactive  Nonreactive 24 1558    ABO ^ A   21 0721    Rh        Antibody Screen ^ Negative   24 1558    HIV ^ Nonreactive  Nonreactive 24 1558    HgB A1c  ^ 4.5 % 4.3 - 5.6 24 1558    Varicella IgG ^ 2.9 AI  24 1558    Hemoglobinopathy (genetic testing) ^ A, A2, F  A,A2,F 21 1348    Cystic fibrosis                         Past OB  History:     OB History    Para Term  AB Living   3 2 2 0 0 2   SAB IAB Ectopic Molar Multiple Live Births   0 0 0 0 0 2      # Outcome Date GA Lbr Sen/2nd Weight Sex Type Anes PTL Lv   3 Current            2 Term 22 38w0d  2750 g (6 lb 1 oz) M Vag-Spont EPI N MAKEDA   1 Term 14 37w0d  2126 g (4 lb 11 oz) F Vag-Spont EPI N MAKEDA      Complications: Oligohydramnios       Past Medical History: History reviewed. No pertinent past medical history.   Past Surgical History History reviewed. No pertinent surgical history.   Family History: Family History   Problem Relation Age of Onset    No Known Problems Mother     No Known Problems Father       Social History:  reports that she has been smoking cigarettes. She started smoking about 19 years ago. She has a 9.5 pack-year smoking history. She does not have any smokeless tobacco history on file.   reports that she does not currently use alcohol.   reports current drug use. Drug: Marijuana.        General ROS: Pertinent items are noted in HPI  Home Medications:  Prenatal Vit-Fe Fumarate-FA, promethazine, and vitamin B-6    Allergies:  No Known Allergies    Objective       Vital Signs Range for the last 24 hours  Temperature: Temp:  [98.4 °F (36.9 °C)] 98.4 °F (36.9 °C)   Temp Source: Temp src: Oral   BP: BP: ()/(44-66) 105/63   Pulse: Heart Rate:  [] 64   Respirations: Resp:  [18-20] 20   SPO2: SpO2:  [99 %-100 %] 100 %     Physical Examination:   General appearance - alert, well appearing, and in no distress  Mental status - alert, oriented to person, place, and time  Chest - normal effort; CTA  Heart - normal rate, regular rhythm  Abdomen - soft, nondistended; no rebound or guarding  Pelvic - SVE - can see foot in upper vagina and part of leg; swabs collected for gc/ct and affirm; gross leakage of fluid  Neurological - alert, oriented, normal speech  Extremities - Edema none  Skin - normal coloration and turgor, no suspicious skin lesions  noted    Presentation:    Cervix: Method: sterile exam per physician   Dilation: Cervical Dilation (cm):  (Open with fetal leg/foot seen)     Lab Results (last 24 hours)       Procedure Component Value Units Date/Time    CBC & Differential [[id number]]  (Abnormal) Collected: 06/24/24 1422    Specimen: Blood from Arm, Right Updated: 06/24/24 1439    Narrative:      The following orders were created for panel order CBC & Differential.  Procedure                               Abnormality         Status                     ---------                               -----------         ------                     CBC Auto Differential[[id number]]        Abnormal            Final result                 Please view results for these tests on the individual orders.    Comprehensive Metabolic Panel [[id number]]  (Abnormal) Collected: 06/24/24 1422    Specimen: Blood from Arm, Right Updated: 06/24/24 1505     Glucose 112 mg/dL      BUN 4 mg/dL      Creatinine 0.63 mg/dL      Sodium 135 mmol/L      Potassium 3.7 mmol/L      Chloride 104 mmol/L      CO2 18.9 mmol/L      Calcium 8.7 mg/dL      Total Protein 6.5 g/dL      Albumin 3.6 g/dL      ALT (SGPT) 7 U/L      AST (SGOT) 14 U/L      Alkaline Phosphatase 60 U/L      Total Bilirubin 0.3 mg/dL      Globulin 2.9 gm/dL      A/G Ratio 1.2 g/dL      BUN/Creatinine Ratio 6.3     Anion Gap 12.1 mmol/L      eGFR 120.3 mL/min/1.73     Narrative:      GFR Normal >60  Chronic Kidney Disease <60  Kidney Failure <15      hCG, Quantitative, Pregnancy [[id number]] Collected: 06/24/24 1422    Specimen: Blood from Arm, Right Updated: 06/24/24 1513     HCG Quantitative 34,593.00 mIU/mL     Narrative:      HCG Ranges by Gestational Age    Females - non-pregnant premenopausal   </= 1mIU/mL HCG  Females - postmenopausal               </= 7mIU/mL HCG    3 Weeks       5.4   -      72 mIU/mL  4 Weeks      10.2   -     708 mIU/mL  5 Weeks       217   -   8,245 mIU/mL  6 Weeks       152   -  32,177 mIU/mL  7  Weeks     4,059   - 153,767 mIU/mL  8 Weeks    31,366   - 149,094 mIU/mL  9 Weeks    59,109   - 135,901 mIU/mL  10 Weeks   44,186   - 170,409 mIU/mL  12 Weeks   27,107   - 201,615 mIU/mL  14 Weeks   24,302   -  93,646 mIU/mL  15 Weeks   12,540   -  69,747 mIU/mL  16 Weeks    8,904   -  55,332 mIU/mL  17 Weeks    8,240   -  51,793 mIU/mL  18 Weeks    9,649   -  55,271 mIU/mL      CBC Auto Differential [[id number]]  (Abnormal) Collected: 06/24/24 1422    Specimen: Blood from Arm, Right Updated: 06/24/24 1439     WBC 14.61 10*3/mm3      RBC 4.23 10*6/mm3      Hemoglobin 12.9 g/dL      Hematocrit 36.6 %      MCV 86.5 fL      MCH 30.5 pg      MCHC 35.2 g/dL      RDW 13.2 %      RDW-SD 41.3 fl      MPV 10.6 fL      Platelets 283 10*3/mm3      Neutrophil % 76.9 %      Lymphocyte % 13.8 %      Monocyte % 6.1 %      Eosinophil % 2.2 %      Basophil % 0.4 %      Immature Grans % 0.6 %      Neutrophils, Absolute 11.24 10*3/mm3      Lymphocytes, Absolute 2.01 10*3/mm3      Monocytes, Absolute 0.89 10*3/mm3      Eosinophils, Absolute 0.32 10*3/mm3      Basophils, Absolute 0.06 10*3/mm3      Immature Grans, Absolute 0.09 10*3/mm3      nRBC 0.0 /100 WBC     T Pallidum Antibody w/ reflex RPR (Syphilis) [[id number]]  (Normal) Collected: 06/24/24 1422    Specimen: Blood from Arm, Right Updated: 06/24/24 1724     Treponemal AB Total Non-Reactive    Narrative:      Reactive results will reflex RPR testing.    Drug Screen (10), Serum [[id number]] Collected: 06/24/24 1717    Specimen: Blood from Hand, Left Updated: 06/24/24 1723    Lupus Anticoagulant [[id number]] Collected: 06/24/24 1717    Specimen: Blood from Hand, Left Updated: 06/24/24 1724    Beta-2 Glycoprotein I Antibody,G / M [[id number]] Collected: 06/24/24 1717    Specimen: Blood from Hand, Left Updated: 06/24/24 1724    Chlamydia trachomatis, Neisseria gonorrhoeae, PCR - Swab, Cervix [[id number]]  (Normal) Collected: 06/24/24 1717    Specimen: Swab from Cervix Updated: 06/24/24  1858     Chlamydia DNA by PCR Not Detected     Neisseria gonorrhoeae by PCR Not Detected    Gardnerella vaginalis, Trichomonas vaginalis, Candida albicans, DNA - Swab, Vagina [[id number]]  (Abnormal) Collected: 24 171    Specimen: Swab from Vagina Updated: 24 181     GARDNERELLA VAGINALIS Negative     TRICHOMONAS VAGINALIS Negative     CANDIDA SPECIES Positive    Anticardiolipin Antibody, IgG / M, Qn [[id number]] Collected: 24    Specimen: Blood Updated: 24             Assessment & Plan     Assessment:  -  Intrauterine pregnancy at 16w4d gestation with ROM, advanced dilation, inevitable . Received one report of FHTs in 170s but not confirmed and patient did not hear any fetal heart tones herself. US reviewed and no FHT seen on official US. RN wanted to confirm no FHTs so US taken to bedside and no FHT seen on bedside exam either.     Plan:  - Discussed with patient and father of baby that delivery was inevitable. No heart tones seen. One dose of po cytotec given. Labs obtained including tox screen and IUFD routine labs. Desires IV pain medication. Morphine ordered.   - Plan of care has been reviewed with patient and patient agrees.   - Risks, benefits of treatment plan have been discussed.  - All questions have been answered.        Electronically signed by Krystina Ornelas MD, 24, 4:56 PM EDT.

## 2024-06-25 VITALS
RESPIRATION RATE: 16 BRPM | TEMPERATURE: 98.8 F | DIASTOLIC BLOOD PRESSURE: 60 MMHG | SYSTOLIC BLOOD PRESSURE: 113 MMHG | OXYGEN SATURATION: 100 % | HEART RATE: 65 BPM

## 2024-06-25 PROCEDURE — 87015 SPECIMEN INFECT AGNT CONCNTJ: CPT | Performed by: OBSTETRICS & GYNECOLOGY

## 2024-06-25 PROCEDURE — 87205 SMEAR GRAM STAIN: CPT | Performed by: OBSTETRICS & GYNECOLOGY

## 2024-06-25 PROCEDURE — 88305 TISSUE EXAM BY PATHOLOGIST: CPT | Performed by: OBSTETRICS & GYNECOLOGY

## 2024-06-25 PROCEDURE — 87147 CULTURE TYPE IMMUNOLOGIC: CPT | Performed by: OBSTETRICS & GYNECOLOGY

## 2024-06-25 PROCEDURE — 87186 SC STD MICRODIL/AGAR DIL: CPT | Performed by: OBSTETRICS & GYNECOLOGY

## 2024-06-25 PROCEDURE — 87075 CULTR BACTERIA EXCEPT BLOOD: CPT | Performed by: OBSTETRICS & GYNECOLOGY

## 2024-06-25 PROCEDURE — 87077 CULTURE AEROBIC IDENTIFY: CPT | Performed by: OBSTETRICS & GYNECOLOGY

## 2024-06-25 PROCEDURE — 87070 CULTURE OTHR SPECIMN AEROBIC: CPT | Performed by: OBSTETRICS & GYNECOLOGY

## 2024-06-25 RX ORDER — FLUCONAZOLE 150 MG/1
150 TABLET ORAL ONCE
Qty: 2 TABLET | Refills: 0 | Status: SHIPPED | OUTPATIENT
Start: 2024-06-25 | End: 2024-06-26

## 2024-06-25 RX ORDER — IBUPROFEN 800 MG/1
800 TABLET ORAL EVERY 8 HOURS PRN
Qty: 20 TABLET | Refills: 0 | Status: SHIPPED | OUTPATIENT
Start: 2024-06-25

## 2024-06-25 RX ADMIN — ACETAMINOPHEN 650 MG: 325 TABLET ORAL at 05:59

## 2024-06-25 NOTE — PLAN OF CARE
Problem: Adult Inpatient Plan of Care  Goal: Plan of Care Review  Outcome: Ongoing, Progressing  Goal: Patient-Specific Goal (Individualized)  Outcome: Ongoing, Progressing  Goal: Absence of Hospital-Acquired Illness or Injury  Outcome: Ongoing, Progressing  Intervention: Identify and Manage Fall Risk  Recent Flowsheet Documentation  Taken 2024 by Ira Escobar RN  Safety Promotion/Fall Prevention: safety round/check completed  Intervention: Prevent Skin Injury  Recent Flowsheet Documentation  Taken 2024 by Ira Escobar RN  Body Position: position changed independently  Intervention: Prevent and Manage VTE (Venous Thromboembolism) Risk  Recent Flowsheet Documentation  Taken 2024 by Ira Escobar RN  Activity Management: bedrest  Goal: Optimal Comfort and Wellbeing  Outcome: Ongoing, Progressing  Goal: Readiness for Transition of Care  Outcome: Ongoing, Progressing  Intervention: Mutually Develop Transition Plan  Recent Flowsheet Documentation  Taken 2024 0100 by Ira Escobar RN  Equipment Currently Used at Home: none  Transportation Anticipated: family or friend will provide  Transportation Concerns: none  Patient/Family Anticipated Services at Transition: none  Patient/Family Anticipates Transition to: home with family     Problem:  Loss  Goal: Optimal Adjustment to Loss  Outcome: Ongoing, Progressing     Problem: Bleeding (Labor)  Goal: Hemostasis  Outcome: Met     Problem: Change in Fetal Wellbeing (Labor)  Goal: Stable Fetal Wellbeing  Outcome: Met  Intervention: Promote and Monitor Fetal Wellbeing  Recent Flowsheet Documentation  Taken 2024 by Ira Escobar RN  Body Position: position changed independently     Problem: Delayed Labor Progression (Labor)  Goal: Effective Progression to Delivery  Outcome: Met     Problem: Infection (Labor)  Goal: Absence of Infection Signs and Symptoms  Outcome: Met     Problem:  Labor Pain (Labor)  Goal: Acceptable Pain Control  Outcome: Met     Problem: Uterine Tachysystole (Labor)  Goal: Normal Uterine Contraction Pattern  Outcome: Met   Goal Outcome Evaluation:

## 2024-06-25 NOTE — PLAN OF CARE
Problem: Adult Inpatient Plan of Care  Goal: Plan of Care Review  Outcome: Met  Flowsheets (Taken 2024 0940)  Progress: improving  Plan of Care Reviewed With:   patient   significant other  Goal: Patient-Specific Goal (Individualized)  Outcome: Met  Goal: Absence of Hospital-Acquired Illness or Injury  Outcome: Met  Intervention: Identify and Manage Fall Risk  Recent Flowsheet Documentation  Taken 2024 by Justyna Hanley RN  Safety Promotion/Fall Prevention: safety round/check completed  Goal: Optimal Comfort and Wellbeing  Outcome: Met  Intervention: Monitor Pain and Promote Comfort  Recent Flowsheet Documentation  Taken 2024 by Justyna Hanley RN  Pain Management Interventions:   pain management plan reviewed with patient/caregiver   medication offered but refused  Intervention: Provide Person-Centered Care  Recent Flowsheet Documentation  Taken 2024 by Justyna Hanley RN  Trust Relationship/Rapport:   care explained   choices provided   emotional support provided   empathic listening provided   reassurance provided   thoughts/feelings acknowledged  Goal: Readiness for Transition of Care  Outcome: Met     Problem:  Loss  Goal: Optimal Adjustment to Loss  Outcome: Met  Intervention: Support the Grieving Process  Recent Flowsheet Documentation  Taken 2024 by Justyna Hanley RN  Supportive Measures:   active listening utilized   positive reinforcement provided   verbalization of feelings encouraged     Problem: Bleeding (Postpartum Vaginal Delivery)  Goal: Hemostasis  Outcome: Met     Problem: Infection (Postpartum Vaginal Delivery)  Goal: Absence of Infection Signs/Symptoms  Outcome: Met  Intervention: Prevent or Manage Infection  Recent Flowsheet Documentation  Taken 2024 by Justyna Hanley RN  Perineal Care:   perineum cleansed   absorbent brief/pad changed     Problem: Pain (Postpartum Vaginal Delivery)  Goal: Acceptable Pain Control  Outcome:  Met  Intervention: Prevent or Manage Pain  Recent Flowsheet Documentation  Taken 6/25/2024 0421 by Justyna Hanley, RN  Pain Management Interventions:   pain management plan reviewed with patient/caregiver   medication offered but refused     Problem: Urinary Retention (Postpartum Vaginal Delivery)  Goal: Effective Urinary Elimination  Outcome: Met   Goal Outcome Evaluation:  Plan of Care Reviewed With: patient, significant other        Progress: improving

## 2024-06-25 NOTE — DISCHARGE SUMMARY
[facility]  Delivery Discharge Summary    Patient Name: Delma Benito  : 1990  MRN: [id number]    Date of Admission: 2024  Date of Discharge:  2024   Primary Care Physician: Provider, No Known  Consults       Date and Time Order Name Status Description    2024  5:01 PM Inpatient Anesthesiology Consult               Procedures:  2024  - Vaginal, Spontaneous  at 1913.    Admitting Diagnosis:  Missed  with fetal demise before 20 completed weeks of gestation [O02.1]    Discharge Diagnosis:  Same as Admitting plus:   Miscarriage at 16w4d - Delivered   Vaginal Candida    Delivery Summary     OB Surgeon:  Krystina Ornelas MD  Anesthesia: IV narcotics  Delivery Type:   Perineum: OBPERINEUM: Intact      Fetus:    Weight: 140 g (4.9 oz)     APGARS: 0  @ 1 minute / 0  @ 5 minutes       Hospital Course     Hospital Course:    Patient is a 34 y.o.  at 16w4d with miscarriage. Patient received misoprostol and delivered vaginally. Her postpartum course was without complications.    On PPD # 1 she was ready for discharge.    She had normal lochia and pain well controlled with oral medications.  She was diagnosed with vaginal candida and fluconazole was e-scribed for treatment.    Day of Discharge     Vital Signs:  Temp:  [98.3 °F (36.8 °C)-98.9 °F (37.2 °C)] 98.8 °F (37.1 °C)  Heart Rate:  [] 65  Resp:  [14-20] 16  BP: ()/(36-66) 113/60    On the day of discharge POSTPARTUM pt tolerating a regular diet, ambulating, pain well controlled, urinating spontaneously and lochia appropriate.   Vital signs were stable and afebrile.  Exam was within normal limits.  Fundus was below umbilicus and nontender.  Meeting discharge criteria and desired discharge home.  Postpartum instructions and FU reviewed and questions answered.    Pertinent  and/or Most Recent Results     LAB RESULTS:   Lab Results   Component Value Date    WBC 14.61 (H) 2024    HGB 12.9 2024    HCT 36.6  06/24/2024     06/24/2024       Discharge Details        Discharge Medications        New Medications        Instructions Start Date   fluconazole 150 MG tablet  Commonly known as: DIFLUCAN   150 mg, Oral, Once, 1 tablet PO x 1 NOW. Repeat in 3 days if still symptomatic.      ibuprofen 800 MG tablet  Commonly known as: ADVIL,MOTRIN   800 mg, Oral, Every 8 Hours PRN             Continue These Medications        Instructions Start Date   PRENATAL VITAMINS PO   Oral      promethazine 25 MG tablet  Commonly known as: PHENERGAN   25 mg, Oral, Every 6 Hours PRN      vitamin B-6 50 MG tablet  Commonly known as: PYRIDOXINE   50 mg, Oral, Daily               No Known Allergies    Discharge Disposition:   Home, self-care    Discharge Condition:  Good    Follow Up:  Patient desires to follow-up with her primary OB/GYN in [place]. Will contact patient's primary OB/GYN office in [place] to arrange for follow-up.     Electronically signed by Dionne Thomas MD, 06/25/24, 8:25 AM EDT.

## 2024-06-25 NOTE — PROGRESS NOTES
Called to room. Patient on bedpan to void and delivered the placenta.  Placenta examined and appears intact.  Cultures from both sides sent to micro.  Uterus firm.  Bleeding minimal.    Krystina Ornelas MD  6/25/2024  00:09 EDT

## 2024-06-25 NOTE — NURSING NOTE
FSN in to see this patient and SO. She has delivered early stillborn. They have named the baby and plans on cremation at [facility]. Patient has not viewed the baby but father has viewed and held the baby. FSN discussed some common emotional responses to expect and some early grief responses. Patient was receptive but not tearful for this conversation.

## 2024-06-26 LAB
CARDIOLIPIN IGG SER IA-ACNC: <9 GPL U/ML (ref 0–14)
CARDIOLIPIN IGM SER IA-ACNC: <9 MPL U/ML (ref 0–12)

## 2024-06-27 LAB
APTT SCREEN TO CONFIRM RATIO: 1.07 RATIO (ref 0–1.34)
B2 GLYCOPROT1 IGG SER-ACNC: <9 GPI IGG UNITS (ref 0–20)
B2 GLYCOPROT1 IGM SER-ACNC: <9 GPI IGM UNITS (ref 0–32)
CONFIRM APTT/NORMAL: 30.2 SEC (ref 0–47.6)
CYTO UR: NORMAL
LA 2 SCREEN W REFLEX-IMP: NORMAL
LAB AP CASE REPORT: NORMAL
LAB AP CLINICAL INFORMATION: NORMAL
PATH REPORT.FINAL DX SPEC: NORMAL
PATH REPORT.GROSS SPEC: NORMAL
SCREEN APTT: 37.4 SEC (ref 0–43.5)
SCREEN DRVVT: 33.3 SEC (ref 0–47)
THROMBIN TIME: 16.2 SEC (ref 0–23)

## 2024-06-28 LAB
BACTERIA SPEC AEROBE CULT: ABNORMAL
GRAM STN SPEC: ABNORMAL
GRAM STN SPEC: ABNORMAL

## 2024-06-29 LAB
AMPHETAMINES SERPL QL SCN: NEGATIVE NG/ML
BARBITURATES SERPL QL SCN: NEGATIVE UG/ML
BENZODIAZ SERPL QL SCN: NEGATIVE NG/ML
CANNABINOIDS SERPL QL SCN: NEGATIVE NG/ML
COCAINE+BZE SERPL QL SCN: NEGATIVE NG/ML
METHADONE SERPL QL SCN: NEGATIVE NG/ML
OPIATES SERPL QL SCN: NEGATIVE NG/ML
OXYCODONE+OXYMORPHONE SERPLBLD QL SCN: NEGATIVE NG/ML
PCP SERPL QL SCN: NEGATIVE NG/ML
PROPOXYPH SERPL QL SCN: NEGATIVE NG/ML

## 2024-06-30 LAB
BACTERIA SPEC ANAEROBE CULT: NORMAL
BACTERIA SPEC ANAEROBE CULT: NORMAL